# Patient Record
Sex: FEMALE | Race: WHITE | NOT HISPANIC OR LATINO | Employment: FULL TIME | URBAN - METROPOLITAN AREA
[De-identification: names, ages, dates, MRNs, and addresses within clinical notes are randomized per-mention and may not be internally consistent; named-entity substitution may affect disease eponyms.]

---

## 2018-01-12 NOTE — RESULT NOTES
Verified Results  (1) TSH WITH FT4 REFLEX [id number] 75:03CF Julienne Nguyễn    Order Number: [id number]     Test Name Result Flag Reference   TSH 2 370 uIU/mL  0 358-3 740   Patients undergoing fluorescein dye angiography may retain small amounts of fluorescein in the body for 48-72 hours post procedure  Samples containing fluorescein can produce falsely depressed TSH values  If the patient had this procedure,a specimen should be resubmitted post fluorescein clearance            The recommended reference ranges for TSH during pregnancy are as follows:  First trimester 0 1 to 2 5 uIU/mL  Second trimester  0 2 to 3 0 uIU/mL  Third trimester 0 3 to 3 0 uIU/m

## 2018-05-08 DIAGNOSIS — B00.9 HERPES: Primary | ICD-10-CM

## 2018-05-08 RX ORDER — VALACYCLOVIR HYDROCHLORIDE 500 MG/1
1 TABLET, FILM COATED ORAL DAILY
COMMUNITY
Start: 2011-05-17 | End: 2018-05-08 | Stop reason: SDUPTHER

## 2018-05-08 RX ORDER — VALACYCLOVIR HYDROCHLORIDE 1 G/1
1000 TABLET, FILM COATED ORAL DAILY
Qty: 90 TABLET | Refills: 0 | Status: SHIPPED | OUTPATIENT
Start: 2018-05-08 | End: 2018-05-15 | Stop reason: SDUPTHER

## 2018-05-08 RX ORDER — VALACYCLOVIR HYDROCHLORIDE 500 MG/1
500 TABLET, FILM COATED ORAL 2 TIMES DAILY
Qty: 30 TABLET | Refills: 0 | Status: SHIPPED | OUTPATIENT
Start: 2018-05-08 | End: 2018-05-15 | Stop reason: SDUPTHER

## 2018-05-08 RX ORDER — VALACYCLOVIR HYDROCHLORIDE 1 G/1
1 TABLET, FILM COATED ORAL DAILY
COMMUNITY
Start: 2015-08-17 | End: 2018-05-08 | Stop reason: SDUPTHER

## 2018-05-15 ENCOUNTER — ANNUAL EXAM (OUTPATIENT)
Dept: GYNECOLOGY | Facility: CLINIC | Age: 43
End: 2018-05-15
Payer: COMMERCIAL

## 2018-05-15 VITALS
HEIGHT: 63 IN | DIASTOLIC BLOOD PRESSURE: 60 MMHG | WEIGHT: 131.6 LBS | SYSTOLIC BLOOD PRESSURE: 98 MMHG | BODY MASS INDEX: 23.32 KG/M2

## 2018-05-15 DIAGNOSIS — B00.9 HERPES: ICD-10-CM

## 2018-05-15 DIAGNOSIS — Z01.419 ENCOUNTER FOR ROUTINE GYNECOLOGICAL EXAMINATION WITH PAPANICOLAOU SMEAR OF CERVIX: Primary | ICD-10-CM

## 2018-05-15 DIAGNOSIS — Z12.31 ENCOUNTER FOR SCREENING MAMMOGRAM FOR MALIGNANT NEOPLASM OF BREAST: ICD-10-CM

## 2018-05-15 PROCEDURE — 87624 HPV HI-RISK TYP POOLED RSLT: CPT | Performed by: NURSE PRACTITIONER

## 2018-05-15 PROCEDURE — G0145 SCR C/V CYTO,THINLAYER,RESCR: HCPCS | Performed by: NURSE PRACTITIONER

## 2018-05-15 PROCEDURE — 99396 PREV VISIT EST AGE 40-64: CPT | Performed by: NURSE PRACTITIONER

## 2018-05-15 RX ORDER — VALACYCLOVIR HYDROCHLORIDE 500 MG/1
500 TABLET, FILM COATED ORAL 2 TIMES DAILY
Qty: 90 TABLET | Refills: 3 | Status: SHIPPED | OUTPATIENT
Start: 2018-05-15 | End: 2018-05-20

## 2018-05-15 RX ORDER — VALACYCLOVIR HYDROCHLORIDE 1 G/1
1000 TABLET, FILM COATED ORAL DAILY
Qty: 90 TABLET | Refills: 3 | Status: SHIPPED | OUTPATIENT
Start: 2018-05-15 | End: 2019-07-22 | Stop reason: SDUPTHER

## 2018-05-15 NOTE — PROGRESS NOTES
Adrian Elias is a 40 y o  female who presents for her annual exam  Periods are regular every 28-30 days, lasting 3 - 5  days  Dysmenorrhea:moderate, occurring first 1-2 days of flow  Cyclic symptoms include none  No intermenstrual bleeding, spotting, or discharge  She continues to use Valtrex 1 gm  daily and will also take an extra 500 mg  tablet if she feels an HSV outbreak beginning  Current contraception: none  History of abnormal Pap smear: no  Family history of breast cancer: yes - Mother  Past Medical History:   Diagnosis Date    Herpes simplex      Family History   Problem Relation Age of Onset    Breast cancer Mother     Thyroid disease Mother     Cancer Father      Lung     Past Surgical History:   Procedure Laterality Date    KNEE SURGERY      MYOMECTOMY       Social History     Social History    Marital status: Single     Spouse name: N/A    Number of children: N/A    Years of education: N/A     Occupational History    Not on file       Social History Main Topics    Smoking status: Never Smoker    Smokeless tobacco: Never Used    Alcohol use Yes    Drug use: No    Sexual activity: Yes     Partners: Male     Birth control/ protection: None     Other Topics Concern    Not on file     Social History Narrative    No narrative on file       Current Outpatient Prescriptions:     valACYclovir (VALTREX) 1,000 mg tablet, Take 1 tablet (1,000 mg total) by mouth daily for 90 days, Disp: 90 tablet, Rfl: 0    valACYclovir (VALTREX) 500 mg tablet, Take 1 tablet (500 mg total) by mouth 2 (two) times a day for 5 days, Disp: 30 tablet, Rfl: 0      Review of Systems  Constitutional :no fever, feels well, no tiredness, no recent weight gain or loss  Cardiovascular: no complaints of slow or fast heart beat, no chest pain, no palpitations  Respiratory: no complaints of shortness of shortness of breath, no TORRES  Breasts:no complaints of breast pain, breast lump, or nipple discharge  Gastrointestinal: no complaints of abdominal pain, constipation,nause, vomiting, or diarrhea or bloody stools  Genitourinary : no complaints of dysuria, incontinence, pelvic pain, dysmenorrhea,vaginal discharge or abnormal vaginal bleeding  Integumentary: no complaints of skin rash or lesion,itching or dry skin  Neurological: no complaints of headache,numbness, tingling, dizziness or fainting       Objective      BP 98/60 (BP Location: Right arm, Patient Position: Sitting, Cuff Size: Standard)   Ht 5' 2 8" (1 595 m)   Wt 59 7 kg (131 lb 9 6 oz)   LMP 05/05/2018 (Approximate)   BMI 23 46 kg/m²     General:   appears stated age","cooperative","alert" normal mood and affect   Neck: Neck: normal, supple,trachea midline, no masses   Heart: regular rate and rhythm, S1, S2 normal, no murmur, click, rub or gallop   Lungs: clear to auscultation bilaterally   Breasts: Breast exam :normal, no dimpling or skin changes noted   Abdomen: soft, non-tender, without masses or organomegaly   Vulva: Normal , no lesions   Vagina: normal , no lesions or dryness   Urethra: normal   Cervix: Normal, no palpable masses A pap smear was done   Uterus: Normal , non-tender,not enlarged,no palpable masses   Adnexa: Normal, non-tender without fullness or masses                         Assessment   Normal GYN exam       Plan      All questions answered  Await pap smear results  Breast self exam technique reviewed and patient encouraged to perform self-exam monthly  Dietary diary  Follow up as needed  Mammogram   Thin prep Pap smear  We discussed her current Valtrex use  She has been advised to try taking 500 mg  daily instead of 1 gm  We also discussed her mother's history of breast cancer and the consideration of genetic testing  She will discuss genetic testing with her mother since  She is uncertain whether or not her mother was tested  The importance of beginning MMG screening was stressed

## 2018-05-17 LAB — HPV RRNA GENITAL QL NAA+PROBE: NORMAL

## 2018-05-18 LAB
LAB AP GYN PRIMARY INTERPRETATION: NORMAL
LAB AP LMP: NORMAL
Lab: NORMAL

## 2018-12-17 ENCOUNTER — DOCUMENTATION (OUTPATIENT)
Dept: GYNECOLOGY | Facility: CLINIC | Age: 43
End: 2018-12-17

## 2019-05-14 DIAGNOSIS — R92.8 ABNORMAL MAMMOGRAM: Primary | ICD-10-CM

## 2019-05-17 DIAGNOSIS — R92.8 ABNORMAL MAMMOGRAM: ICD-10-CM

## 2019-07-22 DIAGNOSIS — B00.9 HERPES: ICD-10-CM

## 2019-07-22 RX ORDER — VALACYCLOVIR HYDROCHLORIDE 1 G/1
1000 TABLET, FILM COATED ORAL DAILY
Qty: 90 TABLET | Refills: 0 | Status: SHIPPED | OUTPATIENT
Start: 2019-07-22 | End: 2019-10-21 | Stop reason: SDUPTHER

## 2019-10-21 DIAGNOSIS — B00.9 HERPES: ICD-10-CM

## 2019-10-21 RX ORDER — VALACYCLOVIR HYDROCHLORIDE 1 G/1
1000 TABLET, FILM COATED ORAL DAILY
Qty: 90 TABLET | Refills: 0 | Status: SHIPPED | OUTPATIENT
Start: 2019-10-21 | End: 2020-01-22

## 2019-11-26 NOTE — PROGRESS NOTES
Assessment/Plan:    Normal findings on routine gyn exam  Advised monthly SBE, annual CBE and continued annual mammography  Reviewed ASCCP guidelines and recommended pap with cotesting q3 yrs for this low risk patient  No pap done today  STI testing was offered and the patient declines; she reports low risk  The patient desires to continue current contraceptive method  Diet/activity recommendations - Calcium 0138-8191 mg daily and Vit D 600-100 IU daily  RTO in one year or sooner PRN  Diagnoses and all orders for this visit:    Encounter for gynecological examination (general) (routine) without abnormal findings        Subjective:      Patient ID: Radha Kilpatrick is a 38 y o  female  This patient presents for routine annual gyn exam    She denies acute gyn complaints  Menses are becoming more irregular, but not closer than 21 days  She was bleeding about every 3 days, now lasting 5 days  She has a hx of myomectomy  She denies pelvic pain, dyspareunia, breast concerns, abnormal discharge, bowel/bladder dysfunction  Pap/HPV testing up to date and normal, 5/15/18, due 2021  Last Mammography normal, 5/15/19 was a diagnostic mammo on Left after an asymmetry was noted during screening mammo  No evidence of maligancy noted with 1 year screening recommendation  She is sexually active with long term boyfriend  The following portions of the patient's history were reviewed and updated as appropriate: allergies, current medications, past family history, past medical history, past social history, past surgical history and problem list     Review of Systems   Constitutional: Negative  HENT: Negative  Respiratory: Negative  Cardiovascular: Negative  Gastrointestinal: Negative  Endocrine: Negative  Genitourinary: Negative for difficulty urinating, dyspareunia, dysuria, frequency, menstrual problem, pelvic pain, urgency, vaginal bleeding, vaginal discharge and vaginal pain     Musculoskeletal: Negative  Skin: Negative  Neurological: Negative  Psychiatric/Behavioral: Negative  Objective:      /62   Pulse 66   Ht 5' 3" (1 6 m)   Wt 61 2 kg (135 lb)   LMP 11/25/2019   BMI 23 91 kg/m²          Physical Exam   Constitutional: She is oriented to person, place, and time  She appears well-developed and well-nourished  She is cooperative  HENT:   Head: Normocephalic and atraumatic  Neck: Normal range of motion  Neck supple  No thyroid mass and no thyromegaly present  Cardiovascular: Normal rate, regular rhythm and normal heart sounds  Pulmonary/Chest: Effort normal and breath sounds normal  Right breast exhibits no inverted nipple, no mass, no nipple discharge, no skin change and no tenderness  Left breast exhibits no inverted nipple, no mass, no nipple discharge, no skin change and no tenderness  No breast tenderness or discharge  Breasts are symmetrical    Abdominal: Soft  Normal appearance and bowel sounds are normal  There is no hepatosplenomegaly  There is no tenderness  Hernia confirmed negative in the right inguinal area and confirmed negative in the left inguinal area  Genitourinary: Vagina normal and uterus normal  Rectal exam shows no external hemorrhoid  No breast tenderness or discharge  Pelvic exam was performed with patient supine  No labial fusion  There is no rash, tenderness, lesion or injury on the right labia  There is no rash, tenderness, lesion or injury on the left labia  Uterus is not deviated, not enlarged, not fixed and not tender  Cervix exhibits no motion tenderness, no discharge and no friability  Right adnexum displays no mass, no tenderness and no fullness  Left adnexum displays no mass, no tenderness and no fullness  No erythema, tenderness or bleeding in the vagina  No signs of injury around the vagina  No vaginal discharge found     Genitourinary Comments: Urethra normal without lesions  No bladder tenderness   Musculoskeletal: Normal range of motion  Lymphadenopathy:     She has no axillary adenopathy  No inguinal adenopathy noted on the right or left side  Right: No inguinal adenopathy present  Left: No inguinal adenopathy present  Neurological: She is alert and oriented to person, place, and time  Skin: Skin is warm, dry and intact  Psychiatric: She has a normal mood and affect  Her speech is normal and behavior is normal  Cognition and memory are normal    Nursing note and vitals reviewed

## 2019-11-29 ENCOUNTER — ANNUAL EXAM (OUTPATIENT)
Dept: GYNECOLOGY | Facility: CLINIC | Age: 44
End: 2019-11-29
Payer: COMMERCIAL

## 2019-11-29 VITALS
BODY MASS INDEX: 23.92 KG/M2 | SYSTOLIC BLOOD PRESSURE: 100 MMHG | HEIGHT: 63 IN | HEART RATE: 66 BPM | WEIGHT: 135 LBS | DIASTOLIC BLOOD PRESSURE: 62 MMHG

## 2019-11-29 DIAGNOSIS — Z01.419 ENCOUNTER FOR GYNECOLOGICAL EXAMINATION (GENERAL) (ROUTINE) WITHOUT ABNORMAL FINDINGS: Primary | ICD-10-CM

## 2019-11-29 PROCEDURE — 99396 PREV VISIT EST AGE 40-64: CPT | Performed by: OBSTETRICS & GYNECOLOGY

## 2019-11-29 NOTE — PATIENT INSTRUCTIONS
Normal findings on routine gyn exam  Advised monthly SBE, annual CBE and continued annual mammography  Reviewed ASCCP guidelines and recommended pap with cotesting q3 yrs for this low risk patient  No pap done today  STI testing was offered and the patient declines; she reports low risk  The patient desires to continue current contraceptive method  Diet/activity recommendations - Calcium 2881-8503 mg daily and Vit D 600-100 IU daily  RTO in one year or sooner PRN

## 2020-01-22 DIAGNOSIS — B00.9 HERPES: ICD-10-CM

## 2020-01-22 RX ORDER — VALACYCLOVIR HYDROCHLORIDE 1 G/1
TABLET, FILM COATED ORAL
Qty: 90 TABLET | Refills: 0 | Status: SHIPPED | OUTPATIENT
Start: 2020-01-22 | End: 2020-04-16 | Stop reason: SDUPTHER

## 2020-04-16 DIAGNOSIS — B00.9 HERPES: ICD-10-CM

## 2020-04-17 RX ORDER — VALACYCLOVIR HYDROCHLORIDE 1 G/1
1000 TABLET, FILM COATED ORAL DAILY
Qty: 90 TABLET | Refills: 3 | Status: SHIPPED | OUTPATIENT
Start: 2020-04-17 | End: 2021-03-29 | Stop reason: SDUPTHER

## 2020-07-27 ENCOUNTER — TELEPHONE (OUTPATIENT)
Dept: GYNECOLOGY | Facility: CLINIC | Age: 45
End: 2020-07-27

## 2020-07-27 DIAGNOSIS — N64.4 BREAST PAIN, RIGHT: Primary | ICD-10-CM

## 2020-07-27 DIAGNOSIS — N64.4 PAIN OF RIGHT BREAST: Primary | ICD-10-CM

## 2020-07-27 DIAGNOSIS — Z12.31 ENCOUNTER FOR SCREENING MAMMOGRAM FOR MALIGNANT NEOPLASM OF BREAST: ICD-10-CM

## 2020-07-27 NOTE — TELEPHONE ENCOUNTER
Pt called and states She having right sided breast pain  ok Per Lisa JAMESON   To send for a diagnostic

## 2021-03-28 DIAGNOSIS — B00.9 HERPES: ICD-10-CM

## 2021-03-29 DIAGNOSIS — B00.9 HERPES: ICD-10-CM

## 2021-03-29 RX ORDER — VALACYCLOVIR HYDROCHLORIDE 1 G/1
1000 TABLET, FILM COATED ORAL DAILY
Qty: 90 TABLET | Refills: 3 | Status: SHIPPED | OUTPATIENT
Start: 2021-03-29 | End: 2021-04-13 | Stop reason: SDUPTHER

## 2021-03-29 RX ORDER — VALACYCLOVIR HYDROCHLORIDE 1 G/1
TABLET, FILM COATED ORAL
Qty: 90 TABLET | Refills: 3 | OUTPATIENT
Start: 2021-03-29

## 2021-04-13 ENCOUNTER — ANNUAL EXAM (OUTPATIENT)
Dept: GYNECOLOGY | Facility: CLINIC | Age: 46
End: 2021-04-13
Payer: COMMERCIAL

## 2021-04-13 VITALS
SYSTOLIC BLOOD PRESSURE: 98 MMHG | HEART RATE: 62 BPM | HEIGHT: 63 IN | BODY MASS INDEX: 24.27 KG/M2 | DIASTOLIC BLOOD PRESSURE: 60 MMHG | WEIGHT: 137 LBS

## 2021-04-13 DIAGNOSIS — Z12.4 PAPANICOLAOU SMEAR FOR CERVICAL CANCER SCREENING: ICD-10-CM

## 2021-04-13 DIAGNOSIS — B00.9 HERPES: ICD-10-CM

## 2021-04-13 DIAGNOSIS — N85.2 ENLARGED UTERUS: ICD-10-CM

## 2021-04-13 DIAGNOSIS — Z01.411 ENCOUNTER FOR GYNECOLOGICAL EXAMINATION (GENERAL) (ROUTINE) WITH ABNORMAL FINDINGS: Primary | ICD-10-CM

## 2021-04-13 PROCEDURE — 99396 PREV VISIT EST AGE 40-64: CPT | Performed by: OBSTETRICS & GYNECOLOGY

## 2021-04-13 PROCEDURE — G0145 SCR C/V CYTO,THINLAYER,RESCR: HCPCS | Performed by: OBSTETRICS & GYNECOLOGY

## 2021-04-13 PROCEDURE — 87624 HPV HI-RISK TYP POOLED RSLT: CPT | Performed by: OBSTETRICS & GYNECOLOGY

## 2021-04-13 RX ORDER — VALACYCLOVIR HYDROCHLORIDE 1 G/1
1000 TABLET, FILM COATED ORAL DAILY
Qty: 90 TABLET | Refills: 3 | Status: SHIPPED | OUTPATIENT
Start: 2021-04-13 | End: 2022-06-03

## 2021-04-13 NOTE — PROGRESS NOTES
Assessment/Plan:    Patient given the option of have TVU at a local facility given that she travels from [place] to our office, she opted to have TVU/SIS in our office  Patient aware to go to ED with severe pain  She has been asymptomatic up until this time  Will continue daily Valtrex suppression  Advised monthly SBE, annual CBE and  continued annual mammography  Reviewed ASCCP guidelines and pap with cotesting done today  STI testing was offered and the patient declines; she reports low risk  The patient declines contraception  She is aware that pregnancy remains possible  Diet/activity recommendations - Calcium 0671-9325 mg daily and Vit D 600-100 IU daily  RTO in one year or sooner PRN  Diagnoses and all orders for this visit:    Encounter for gynecological examination (general) (routine) with abnormal findings    Papanicolaou smear for cervical cancer screening  -     Liquid-based pap, screening    Herpes  -     valACYclovir (VALTREX) 1,000 mg tablet; Take 1 tablet (1,000 mg total) by mouth daily    Enlarged uterus        Subjective:      Patient ID: Yudith Agudelo is a 41 y o  female  This patient presents for routine annual gyn exam    She denies gyn concerns  Menses are becoming more irregular and light to mod  She has a hx of myomectomy 22 years ago  Hx of genital herpes, on daily valtrex without outbreaks for many years  She denies pelvic pain, dyspareunia, breast concerns, abnormal discharge, bowel/bladder dysfunction, depression/anxiety  Pap/HPV testing up to date and normal, 5/15/18  Last Mammography normal, 8/4/20 and was diagnostic due to right breast pain which has resolved  Monogamous for 8 years  She denies STI concerns  She has not conceived and has not used BC             The following portions of the patient's history were reviewed and updated as appropriate: allergies, current medications, past family history, past medical history, past social history, past surgical history and problem list     Review of Systems   Constitutional: Negative  HENT: Negative  Respiratory: Negative  Cardiovascular: Negative  Gastrointestinal: Negative  Endocrine: Negative  Genitourinary: Negative for difficulty urinating, dyspareunia, dysuria, frequency, menstrual problem, pelvic pain, urgency, vaginal bleeding, vaginal discharge and vaginal pain  Musculoskeletal: Negative  Skin: Negative  Neurological: Negative  Psychiatric/Behavioral: Negative  Objective:      BP 98/60 (BP Location: Left arm, Patient Position: Sitting)   Pulse 62   Ht 5' 3 25" (1 607 m)   Wt 62 1 kg (137 lb)   LMP 03/18/2021   BMI 24 08 kg/m²          Physical Exam  Vitals signs and nursing note reviewed  Exam conducted with a chaperone present  Constitutional:       Appearance: Normal appearance  She is well-developed  HENT:      Head: Normocephalic and atraumatic  Neck:      Musculoskeletal: Normal range of motion and neck supple  Thyroid: No thyroid mass or thyromegaly  Cardiovascular:      Rate and Rhythm: Normal rate and regular rhythm  Heart sounds: Normal heart sounds  Pulmonary:      Effort: Pulmonary effort is normal       Breath sounds: Normal breath sounds  Chest:      Breasts: Breasts are symmetrical          Right: No inverted nipple, mass, nipple discharge, skin change or tenderness  Left: No inverted nipple, mass, nipple discharge, skin change or tenderness  Abdominal:      General: Bowel sounds are normal       Palpations: Abdomen is soft  Tenderness: There is no abdominal tenderness  Hernia: There is no hernia in the left inguinal area or right inguinal area  Genitourinary:     General: Normal vulva  Exam position: Supine  Pubic Area: No rash  Labia:         Right: No rash, tenderness, lesion or injury  Left: No rash, tenderness, lesion or injury         Urethra: No prolapse, urethral pain, urethral swelling or urethral lesion  Vagina: Normal  No signs of injury and foreign body  No vaginal discharge, erythema, tenderness, bleeding, lesions or prolapsed vaginal walls  Cervix: No cervical motion tenderness, discharge, friability, lesion, erythema, cervical bleeding or eversion  Uterus: Deviated (to the right) and enlarged  Not fixed, not tender and no uterine prolapse  Adnexa:         Right: No mass, tenderness or fullness  Left: No mass, tenderness or fullness  Rectum: No external hemorrhoid  Comments: Urethra normal without lesions  No bladder tenderness  Musculoskeletal: Normal range of motion  Lymphadenopathy:      Lower Body: No right inguinal adenopathy  No left inguinal adenopathy  Skin:     General: Skin is warm and dry  Neurological:      Mental Status: She is alert and oriented to person, place, and time  Psychiatric:         Speech: Speech normal          Behavior: Behavior normal  Behavior is cooperative

## 2021-04-16 LAB
HPV HR 12 DNA CVX QL NAA+PROBE: NEGATIVE
HPV16 DNA CVX QL NAA+PROBE: NEGATIVE
HPV18 DNA CVX QL NAA+PROBE: NEGATIVE

## 2021-04-21 LAB
LAB AP GYN PRIMARY INTERPRETATION: NORMAL
Lab: NORMAL

## 2021-05-05 ENCOUNTER — OFFICE VISIT (OUTPATIENT)
Dept: GYNECOLOGY | Facility: CLINIC | Age: 46
End: 2021-05-05
Payer: COMMERCIAL

## 2021-05-05 ENCOUNTER — ULTRASOUND (OUTPATIENT)
Dept: GYNECOLOGY | Facility: CLINIC | Age: 46
End: 2021-05-05
Payer: COMMERCIAL

## 2021-05-05 DIAGNOSIS — N92.6 IRREGULAR MENSES: Primary | ICD-10-CM

## 2021-05-05 DIAGNOSIS — D21.9 FIBROIDS: Primary | ICD-10-CM

## 2021-05-05 DIAGNOSIS — N92.0 MENORRHAGIA WITH REGULAR CYCLE: ICD-10-CM

## 2021-05-05 LAB — SL AMB POCT URINE HCG: NORMAL

## 2021-05-05 PROCEDURE — 88305 TISSUE EXAM BY PATHOLOGIST: CPT | Performed by: PATHOLOGY

## 2021-05-05 PROCEDURE — 58100 BIOPSY OF UTERUS LINING: CPT | Performed by: OBSTETRICS & GYNECOLOGY

## 2021-05-05 PROCEDURE — 76830 TRANSVAGINAL US NON-OB: CPT | Performed by: OBSTETRICS & GYNECOLOGY

## 2021-05-05 PROCEDURE — 58340 CATHETER FOR HYSTEROGRAPHY: CPT | Performed by: OBSTETRICS & GYNECOLOGY

## 2021-05-05 PROCEDURE — 99214 OFFICE O/P EST MOD 30 MIN: CPT | Performed by: OBSTETRICS & GYNECOLOGY

## 2021-05-05 PROCEDURE — 81025 URINE PREGNANCY TEST: CPT | Performed by: OBSTETRICS & GYNECOLOGY

## 2021-05-05 PROCEDURE — 76831 ECHO EXAM UTERUS: CPT | Performed by: OBSTETRICS & GYNECOLOGY

## 2021-05-05 NOTE — PROGRESS NOTES
AMB US Pelvic Non OB    Date/Time: 5/5/2021 7:31 AM  Performed by: Scot Moran  Authorized by: Gregorio Chauhan DO   Universal Protocol:  Patient identity confirmed: verbally with patient      Procedure details:     Technique:  Transvaginal US, Non-OB    Position: lithotomy exam    Uterine findings:     Length (cm): 7 96    Height (cm):  5 78    Width (cm):  6 87    Endometrial stripe: identified      Endometrium thickness (mm):  20 9  Left ovary findings:     Left ovary:  Visualized    Length (cm): 4 12    Height (cm): 2 22    Width (cm): 2 89  Right ovary findings:     Right ovary:  Visualized    Length (cm): 4 17    Height (cm): 3 48    Width (cm): 3 09  Other findings:     Free pelvic fluid: not identified      Free peritoneal fluid: not identified    Post-Procedure Details:     Impression:  Anteverted uterus demonstrates multiple fibroids  Largest are posterior subserosal mid uterus 2 4cm, anterior right fundal subserosal 4 1cm, anterior subserosal 1 9, and left posterior lower uterine segment 3 2cm  The bilateral ovaries appear within normal limits  The right ovary contains two follicles, 7 1VA and 6 7KJ  No free fluid  Tolerance: Tolerated well, no immediate complications    Complications: no complications    Additional Procedure Comments:      [employer] P5 transvaginal transducer E8C with Serial Number [id number] was used during procedure and subsequently cleaned with high level disinfection utilizing the [facility]       Ultrasound performed at:     [facility] for [facility] [address]  [address]  [address] [place] [address] Rd, [address]   Phone: [phone]  Fax:  [phone]    Date/Time: 5/5/2021 7:32 AM  Performed by: Gregorio Chauhan DO  Authorized by: Gregorio Chauhan DO   Universal Protocol:  Patient identity confirmed: verbally with patient      Pre-procedure:     Prepped with: Betadine    Procedure:     Catheter inserted: yes Uterine cavity distended with saline: yes    Post-procedure:     Patient observed: yes      Post procedure instructions given to patient: yes      Patient tolerated procedure well with no complications: yes    Comments:      Sonohysterogram demonstrates a thickened endometrium with irregular walls     Endometrial biopsy    Date/Time: 5/5/2021 7:32 AM  Performed by: Maren Cyr DO  Authorized by: Maren Cyr DO     Indication:     Indications: Post-menopausal bleeding    Procedure:     Procedure: endometrial biopsy with Pipelle      A bivalve speculum was placed in the vagina: yes      Cervix cleaned and prepped: yes      Specimen collected: specimen collected and sent to pathology      Patient tolerated procedure well with no complications: yes

## 2021-05-05 NOTE — PROGRESS NOTES
Assessment/Plan:    Discussed fibroid uterus/menorrhagia and options including following versus medical management with either Depo-Provera or Al Hussain or Orlissa  Also discussed surgical options  At this point since she is only mildly symptomatic she opts to follow  Diagnoses and all orders for this visit:    Fibroids    Menorrhagia with regular cycle        Subjective:      Patient ID: Lorna Mendoza is a 41 y o  female  HPI G2 P 0020 presented to the office for transvaginal scan saline infusion hysterosonography possible biopsy  Patient has a known fibroid uterus  She is status post laparoscopic myomectomy 22 years ago  She states her menses are regular albeit heavy  Been like this for several years  The following portions of the patient's history were reviewed and updated as appropriate:   She  has a past medical history of Herpes simplex  She There are no active problems to display for this patient  She  has a past surgical history that includes Knee surgery and Myomectomy  Her family history includes Breast cancer in her mother; Cancer in her father; Thyroid disease in her mother  She  reports that she has quit smoking  Her smoking use included cigarettes  She started smoking about 19 years ago  She has never used smokeless tobacco  She reports current alcohol use  She reports that she does not use drugs  Current Outpatient Medications   Medication Sig Dispense Refill    valACYclovir (VALTREX) 1,000 mg tablet Take 1 tablet (1,000 mg total) by mouth daily 90 tablet 3     No current facility-administered medications for this visit  Current Outpatient Medications on File Prior to Visit   Medication Sig    valACYclovir (VALTREX) 1,000 mg tablet Take 1 tablet (1,000 mg total) by mouth daily     No current facility-administered medications on file prior to visit  She has No Known Allergies       Review of Systems      Objective: There were no vitals taken for this visit  Physical Exam  Vitals signs reviewed  Constitutional:       Appearance: Normal appearance  Cardiovascular:      Rate and Rhythm: Normal rate and regular rhythm  Pulses: Normal pulses  Heart sounds: Normal heart sounds  Pulmonary:      Effort: Pulmonary effort is normal  No respiratory distress  Breath sounds: Normal breath sounds  Abdominal:      General: Abdomen is flat  There is no distension  Palpations: Abdomen is soft  There is no mass  Tenderness: There is no abdominal tenderness  There is no guarding or rebound  Hernia: No hernia is present  There is no hernia in the left inguinal area or right inguinal area  Genitourinary:     General: Normal vulva  Labia:         Right: No rash, tenderness or lesion  Left: No rash, tenderness or lesion  Vagina: Normal       Cervix: Normal       Uterus: Enlarged (10-12 wk size)  Not deviated, not fixed and not tender  Adnexa:         Right: No mass, tenderness or fullness  Left: No mass, tenderness or fullness  Lymphadenopathy:      Lower Body: No right inguinal adenopathy  No left inguinal adenopathy  Neurological:      Mental Status: She is alert         AMB US Pelvic Non OB   Date/Time: 5/5/2021 7:31 AM  Performed by: Marianna Moreno  Authorized by: Jae Huff DO   Universal Protocol:  Patient identity confirmed: verbally with patient        Procedure details:     Technique:  Transvaginal US, Non-OB    Position: lithotomy exam    Uterine findings:     Length (cm): 7 96    Height (cm):  5 78    Width (cm):  6 87    Endometrial stripe: identified      Endometrium thickness (mm):  20 9  Left ovary findings:     Left ovary:  Visualized    Length (cm): 4 12    Height (cm): 2 22    Width (cm): 2 89  Right ovary findings:     Right ovary:  Visualized    Length (cm): 4 17    Height (cm): 3 48    Width (cm): 3 09  Other findings:     Free pelvic fluid: not identified      Free peritoneal fluid: not identified    Post-Procedure Details:     Impression:  Anteverted uterus demonstrates multiple fibroids  Largest are posterior subserosal mid uterus 2 4cm, anterior right fundal subserosal 4 1cm, anterior subserosal 1 9, and left posterior lower uterine segment 3 2cm  The bilateral ovaries appear within normal limits  The right ovary contains two follicles, 0 2DO and 4 1OP  No free fluid  Tolerance: Tolerated well, no immediate complications    Complications: no complications    Additional Procedure Comments:      [employer] P5 transvaginal transducer E8C with Serial Number [id number] was used during procedure and subsequently cleaned with high level disinfection utilizing the [employer]       Ultrasound performed at:   [place] [id number] for [facility] 126  [address]  [address], [address]   Phone: [phone]  Fax:  [phone]     Sonohysterogram   Date/Time: 5/5/2021 7:32 AM  Performed by: Markus Levi DO  Authorized by: Markus Levi DO   Universal Protocol:  Patient identity confirmed: verbally with patient        Pre-procedure:     Prepped with: Betadine    Procedure:     Catheter inserted: yes      Uterine cavity distended with saline: yes    Post-procedure:     Patient observed: yes      Post procedure instructions given to patient: yes      Patient tolerated procedure well with no complications: yes    Comments:      Sonohysterogram demonstrates a thickened endometrium with irregular walls     Endometrial biopsy   Date/Time: 5/5/2021 7:32 AM  Performed by: Markus Levi DO  Authorized by: Markus Levi DO      Indication:     Indications: Post-menopausal bleeding    Procedure:     Procedure: endometrial biopsy with Pipelle      A bivalve speculum was placed in the vagina: yes      Cervix cleaned and prepped: yes      Specimen collected: specimen collected and sent to pathology      Patient tolerated procedure well with no complications: yes

## 2022-06-07 ENCOUNTER — TELEPHONE (OUTPATIENT)
Dept: GYNECOLOGY | Facility: CLINIC | Age: 47
End: 2022-06-07

## 2022-06-07 NOTE — TELEPHONE ENCOUNTER
----- Message from [id number]  [facility] sent at 6/3/2022  8:02 AM EDT -----  Regarding: needs annual  Please call to schedule annual

## 2022-08-18 DIAGNOSIS — B00.9 HERPES: ICD-10-CM

## 2022-08-19 RX ORDER — VALACYCLOVIR HYDROCHLORIDE 1 G/1
TABLET, FILM COATED ORAL
Qty: 90 TABLET | Refills: 0 | Status: SHIPPED | OUTPATIENT
Start: 2022-08-19 | End: 2022-11-17

## 2022-12-01 DIAGNOSIS — B00.9 HERPES: ICD-10-CM

## 2022-12-01 RX ORDER — VALACYCLOVIR HYDROCHLORIDE 1 G/1
1000 TABLET, FILM COATED ORAL DAILY
Qty: 90 TABLET | Refills: 0 | Status: SHIPPED | OUTPATIENT
Start: 2022-12-01 | End: 2023-03-01

## 2023-01-06 ENCOUNTER — ANNUAL EXAM (OUTPATIENT)
Dept: GYNECOLOGY | Facility: CLINIC | Age: 48
End: 2023-01-06

## 2023-01-06 VITALS
DIASTOLIC BLOOD PRESSURE: 78 MMHG | WEIGHT: 134 LBS | HEIGHT: 63 IN | BODY MASS INDEX: 23.74 KG/M2 | SYSTOLIC BLOOD PRESSURE: 100 MMHG

## 2023-01-06 DIAGNOSIS — Z01.411 ENCOUNTER FOR GYNECOLOGICAL EXAMINATION (GENERAL) (ROUTINE) WITH ABNORMAL FINDINGS: Primary | ICD-10-CM

## 2023-01-06 DIAGNOSIS — N92.0 MENORRHAGIA WITH REGULAR CYCLE: ICD-10-CM

## 2023-01-06 DIAGNOSIS — Z12.4 PAPANICOLAOU SMEAR FOR CERVICAL CANCER SCREENING: ICD-10-CM

## 2023-01-06 DIAGNOSIS — D21.9 FIBROIDS: ICD-10-CM

## 2023-01-06 RX ORDER — MELATONIN
1000 DAILY
COMMUNITY

## 2023-01-06 RX ORDER — MAGNESIUM L-LACTATE 84 MG
84 TABLET, EXTENDED RELEASE ORAL DAILY
COMMUNITY

## 2023-01-06 NOTE — PROGRESS NOTES
Assessment/Plan:       Diagnoses and all orders for this visit:    Encounter for gynecological examination (general) (routine) with abnormal findings    Papanicolaou smear for cervical cancer screening  -     Liquid-based pap, screening    Fibroids; discussed fibroid uterus and options including following versus medical management versus uterine artery embolization versus surgical management with either myomectomy or hysterectomy  The patient has completed childbearing  She has opted to proceed with a hysterectomy  Discussed LS BS versus TLH BS  She has opted to proceed with an [facility] BS  The risks of the surgery were discussed including, but not limited to, infection, hemorrhage, bowel bladder or vascular injury, possible necessity for laparotomy  Menorrhagia with regular cycle      20 additional minutes discussing fibroids and coordinating care        Subjective:      Patient ID: Mario Alberto Arzate is a 56 y o  female  HPI G 2 P 0020, presents for annual examination  She also presents to discuss her fibroids and options  Patient is status post laparoscopic myomectomy at age 30  Over the past several years she has been experiencing heavy flows for least 2 days of her cycle  This is associated with increasing cramps lower back pain urinary frequency and pelvic pressure  She had an ultrasound done May 2021 which revealed at least 4 dominant fibroids measuring 2 4 cm, 4 1 cm, 1 9 cm, 3 2 cm  Endometrial biopsy at that time was benign  No urinary complaints other than urinary frequency  The following portions of the patient's history were reviewed and updated as appropriate:   She  has a past medical history of Herpes simplex  She There are no problems to display for this patient  She  has a past surgical history that includes Knee surgery and Myomectomy  Her family history includes Breast cancer in her mother; Cancer in her father; Thyroid disease in her mother    She  reports that she has quit smoking  Her smoking use included cigarettes  She started smoking about 21 years ago  She has a 2 50 pack-year smoking history  She has never used smokeless tobacco  She reports current alcohol use of about 2 0 standard drinks per week  She reports that she does not use drugs  Current Outpatient Medications   Medication Sig Dispense Refill   • cholecalciferol (VITAMIN D3) 1,000 units tablet Take 1,000 Units by mouth daily     • magnesium (MAGTAB) 84 MG (7MEQ) TBCR Take 84 mg by mouth daily     • Probiotic Product (PROBIOTIC-10 PO) Take by mouth     • valACYclovir (VALTREX) 1,000 mg tablet Take 1 tablet (1,000 mg total) by mouth daily 90 tablet 0     No current facility-administered medications for this visit  Current Outpatient Medications on File Prior to Visit   Medication Sig   • cholecalciferol (VITAMIN D3) 1,000 units tablet Take 1,000 Units by mouth daily   • magnesium (MAGTAB) 84 MG (7MEQ) TBCR Take 84 mg by mouth daily   • Probiotic Product (PROBIOTIC-10 PO) Take by mouth   • valACYclovir (VALTREX) 1,000 mg tablet Take 1 tablet (1,000 mg total) by mouth daily     No current facility-administered medications on file prior to visit  She has No Known Allergies       Review of Systems   Constitutional: Negative  Gastrointestinal: Negative  Genitourinary: Positive for frequency, menstrual problem and pelvic pain  Objective:      /78 (BP Location: Left arm)   Ht 5' 3 25" (1 607 m)   Wt 60 8 kg (134 lb)   LMP 12/25/2022   BMI 23 55 kg/m²          Physical Exam  Vitals reviewed  Constitutional:       Appearance: Normal appearance  She is normal weight  Cardiovascular:      Rate and Rhythm: Normal rate and regular rhythm  Pulses: Normal pulses  Heart sounds: Normal heart sounds  No murmur heard  Pulmonary:      Effort: Pulmonary effort is normal  No respiratory distress  Breath sounds: Normal breath sounds     Chest:   Breasts:     Right: No swelling, bleeding, inverted nipple, mass, nipple discharge, skin change or tenderness  Left: No swelling, bleeding, inverted nipple, mass, nipple discharge, skin change or tenderness  Abdominal:      General: There is no distension  Palpations: Abdomen is soft  There is no mass  Tenderness: There is no abdominal tenderness  There is no guarding or rebound  Hernia: No hernia is present  There is no hernia in the left inguinal area or right inguinal area  Genitourinary:     General: Normal vulva  Labia:         Right: No rash, tenderness or lesion  Left: No rash, tenderness or lesion  Vagina: Normal       Cervix: Normal       Uterus: Enlarged (12 wk size; irregular contour)  Adnexa:         Right: No mass, tenderness or fullness  Left: No mass, tenderness or fullness  Musculoskeletal:      Cervical back: Normal range of motion and neck supple  No tenderness  Lymphadenopathy:      Cervical: No cervical adenopathy  Upper Body:      Right upper body: No supraclavicular, axillary or pectoral adenopathy  Left upper body: No supraclavicular, axillary or pectoral adenopathy  Lower Body: No right inguinal adenopathy  No left inguinal adenopathy  Neurological:      Mental Status: She is alert

## 2023-01-16 ENCOUNTER — DOCUMENTATION (OUTPATIENT)
Dept: GYNECOLOGY | Facility: CLINIC | Age: 48
End: 2023-01-16

## 2023-01-16 LAB
LAB AP GYN PRIMARY INTERPRETATION: NORMAL
Lab: NORMAL

## 2023-02-24 ENCOUNTER — TELEPHONE (OUTPATIENT)
Dept: GYNECOLOGY | Facility: CLINIC | Age: 48
End: 2023-02-24

## 2023-03-01 DIAGNOSIS — B00.9 HERPES: ICD-10-CM

## 2023-03-01 RX ORDER — VALACYCLOVIR HYDROCHLORIDE 1 G/1
1000 TABLET, FILM COATED ORAL DAILY
Qty: 90 TABLET | Refills: 3 | Status: SHIPPED | OUTPATIENT
Start: 2023-03-01 | End: 2023-05-30

## 2023-04-19 PROCEDURE — NC001 PR NO CHARGE: Performed by: OBSTETRICS & GYNECOLOGY

## 2023-04-19 NOTE — H&P
Fibroids; discussed fibroid uterus and options including following versus medical management versus uterine artery embolization versus surgical management with either myomectomy or hysterectomy  The patient has completed childbearing  She has opted to proceed with a hysterectomy  Discussed LS BS versus TLH BS  She has opted to proceed with an [facility] BS  The risks of the surgery were discussed including, but not limited to, infection, hemorrhage, bowel, bladder, or vascular injury, possible necessity for laparotomy      Menorrhagia with regular cycle        20 additional minutes discussing fibroids and coordinating care         Subjective:       Patient ID: Liane Del Rio is a 56 y o  female      HPI G 2 P 0020, presents for annual examination  She also presents to discuss her fibroids and options  Patient is status post laparoscopic myomectomy at age 30  Over the past several years she has been experiencing heavy flows for at  least 2 days of her cycle  This is associated with increasing cramps, lower back pain, urinary frequency, and pelvic pressure  She had an ultrasound done May 2021 which revealed at least 4 dominant fibroids measuring 2 4 cm, 4 1 cm, 1 9 cm, 3 2 cm  Endometrial biopsy at that time was benign  No urinary complaints other than urinary frequency      The following portions of the patient's history were reviewed and updated as appropriate:   She  has a past medical history of Herpes simplex  She There are no problems to display for this patient      She  has a past surgical history that includes Knee surgery and Myomectomy  Her family history includes Breast cancer in her mother; Cancer in her father; Thyroid disease in her mother  She  reports that she has quit smoking  Her smoking use included cigarettes  She started smoking about 21 years ago  She has a 2 50 pack-year smoking history   She has never used smokeless tobacco  She reports current alcohol use of about 2 0 standard drinks "per week  She reports that she does not use drugs  Current Outpatient Medications   Medication Sig Dispense Refill   • cholecalciferol (VITAMIN D3) 1,000 units tablet Take 1,000 Units by mouth daily       • magnesium (MAGTAB) 84 MG (7MEQ) TBCR Take 84 mg by mouth daily       • Probiotic Product (PROBIOTIC-10 PO) Take by mouth       • valACYclovir (VALTREX) 1,000 mg tablet Take 1 tablet (1,000 mg total) by mouth daily 90 tablet 0      No current facility-administered medications for this visit            Current Outpatient Medications on File Prior to Visit   Medication Sig   • cholecalciferol (VITAMIN D3) 1,000 units tablet Take 1,000 Units by mouth daily   • magnesium (MAGTAB) 84 MG (7MEQ) TBCR Take 84 mg by mouth daily   • Probiotic Product (PROBIOTIC-10 PO) Take by mouth   • valACYclovir (VALTREX) 1,000 mg tablet Take 1 tablet (1,000 mg total) by mouth daily      No current facility-administered medications on file prior to visit       She has No Known Allergies        Review of Systems   Constitutional: Negative  Gastrointestinal: Negative  Genitourinary: Positive for frequency, menstrual problem and pelvic pain           Objective:        /78 (BP Location: Left arm)   Ht 5' 3 25\" (1 607 m)   Wt 60 8 kg (134 lb)   LMP 12/25/2022   BMI 23 55 kg/m²             Physical Exam  Vitals reviewed  Constitutional:       Appearance: Normal appearance  She is normal weight  Cardiovascular:      Rate and Rhythm: Normal rate and regular rhythm  Pulses: Normal pulses  Heart sounds: Normal heart sounds  No murmur heard  Pulmonary:      Effort: Pulmonary effort is normal  No respiratory distress  Breath sounds: Normal breath sounds  Chest:   Breasts:     Right: No swelling, bleeding, inverted nipple, mass, nipple discharge, skin change or tenderness  Left: No swelling, bleeding, inverted nipple, mass, nipple discharge, skin change or tenderness     Abdominal:      General: There " is no distension  Palpations: Abdomen is soft  There is no mass  Tenderness: There is no abdominal tenderness  There is no guarding or rebound  Hernia: No hernia is present  There is no hernia in the left inguinal area or right inguinal area  Genitourinary:     General: Normal vulva  Labia:         Right: No rash, tenderness or lesion  Left: No rash, tenderness or lesion  Vagina: Normal       Cervix: Normal       Uterus: Enlarged (12 wk size; irregular contour)  Adnexa:         Right: No mass, tenderness or fullness  Left: No mass, tenderness or fullness  Musculoskeletal:      Cervical back: Normal range of motion and neck supple  No tenderness  Lymphadenopathy:      Cervical: No cervical adenopathy  Upper Body:      Right upper body: No supraclavicular, axillary or pectoral adenopathy  Left upper body: No supraclavicular, axillary or pectoral adenopathy  Lower Body: No right inguinal adenopathy  No left inguinal adenopathy  Neurological:      Mental Status: She is alert

## 2023-04-21 ENCOUNTER — TELEPHONE (OUTPATIENT)
Dept: GYNECOLOGY | Facility: CLINIC | Age: 48
End: 2023-04-21

## 2023-04-21 NOTE — TELEPHONE ENCOUNTER
Pt feels like she is going crazy  Her hormones are all out of whack and her  Periods are all of a sudden coming every two weeks  Is there something she can do or take until surgery? Please advise

## 2023-04-24 ENCOUNTER — TELEPHONE (OUTPATIENT)
Dept: GYNECOLOGY | Facility: CLINIC | Age: 48
End: 2023-04-24

## 2023-04-24 DIAGNOSIS — N92.6 IRREGULAR MENSES: Primary | ICD-10-CM

## 2023-04-24 RX ORDER — NORETHINDRONE ACETATE AND ETHINYL ESTRADIOL, ETHINYL ESTRADIOL AND FERROUS FUMARATE 1MG-10(24)
1 KIT ORAL DAILY
Qty: 84 TABLET | Refills: 3 | Status: SHIPPED | OUTPATIENT
Start: 2023-04-24 | End: 2023-05-16

## 2023-04-24 NOTE — TELEPHONE ENCOUNTER
Pt  Would like to try Loloestrin  [facility]

## 2023-04-25 ENCOUNTER — TELEPHONE (OUTPATIENT)
Dept: GYNECOLOGY | Facility: CLINIC | Age: 48
End: 2023-04-25

## 2023-05-09 ENCOUNTER — TELEPHONE (OUTPATIENT)
Dept: GYNECOLOGY | Facility: CLINIC | Age: 48
End: 2023-05-09

## 2023-05-09 LAB
ABO GROUP BLD: NORMAL
EST. AVERAGE GLUCOSE BLD GHB EST-MCNC: 105 MG/DL
HBA1C MFR BLD: 5.3 % (ref 4.8–5.6)
HCT VFR BLD AUTO: 39.1 % (ref 34–46.6)
HGB BLD-MCNC: 13.1 G/DL (ref 11.1–15.9)
RH BLD: POSITIVE

## 2023-05-09 NOTE — TELEPHONE ENCOUNTER
Dr Gilbert Mention -  Can you see any reason patient would need a cardiologist clearance? She is under 50yo and states she does not have any cardiac hx  Please advise  Pt received a phone call yesterday from [facility] stating she needed to see her cardiologist or family dr prior to surgery  Pt is under 55yo and does not have caridac issues  So she was concerned why they would tell her this  And also she lives in [place] and had her blood drawn at [facility] and was also told she can't do that, that it had to be done at [facility]  Pt does not have a [facility] facility even remotely close to her and was under the impression as long as [facility] faxed us the results she was fine  She went yesterday for all the labs and they even told her they can draw all of them and without any issues  Please advise if there is a issue with what she did and was told  Education provided about continuation of care, follow up care and medication administration. Parent/guardian able to provided teach back about discharge instructions. Pt discharged home with parent. Pt acting age appropriately, respirations regular and unlabored, cap refill less than two seconds. Skin pink, dry and warm. Lungs clear bilaterally. No further complaints at this time. Parent verbalized understanding of discharge paperwork and has no further questions at this time.

## 2023-05-16 NOTE — PRE-PROCEDURE INSTRUCTIONS
Pre-Surgery Instructions:   Medication Instructions   • B Complex Vitamins (B COMPLEX PO) Stop taking 7 days prior to surgery  • cholecalciferol (VITAMIN D3) 1,000 units tablet Stop taking 7 days prior to surgery  • DANDELION PO Stop taking 7 days prior to surgery  • FERROUS SULFATE PO Stop taking 7 days prior to surgery  • levOCARNitine (CARNITINE PO) Stop taking 7 days prior to surgery  • magnesium (MAGTAB) 84 MG (7MEQ) TBCR Stop taking 7 days prior to surgery  • Probiotic Product (PROBIOTIC-10 PO) Stop taking 7 days prior to surgery  • Sodium Hyaluronate, oral, (HYALURONIC ACID PO) Stop taking 7 days prior to surgery  • valACYclovir (VALTREX) 1,000 mg tablet Take day of surgery  Medication instructions for day surgery reviewed  Please use only a sip of water to take your instructed medications  Avoid all over the counter vitamins, supplements and NSAIDS for one week prior to surgery per anesthesia guidelines  Tylenol is ok to take as needed  You will receive a call one business day prior to surgery with an arrival time and hospital directions  If your surgery is scheduled on a Monday, the hospital will be calling you on the Friday prior to your surgery  If you have not heard from anyone by 8pm, please call the hospital supervisor through the hospital  at [phone]  [facility] [phone])  Do not eat or drink anything after midnight the night before your surgery, including candy, mints, lifesavers, or chewing gum  Do not drink alcohol 24hrs before your surgery  Pt received carb drinks x 3 and was instructed on timing of consumption  Try not to smoke at least 24hrs before your surgery  Follow the pre surgery showering instructions as listed in the [facility] Surgical Experience Booklet” or otherwise provided by your surgeon's office  Do not shave the surgical area 24 hours before surgery   Do not apply any lotions, creams, including makeup, cologne, deodorant, or perfumes after showering on the day of your surgery  No contact lenses, eye make-up, or artificial eyelashes  Remove nail polish, including gel polish, and any artificial, gel, or acrylic nails if possible  Remove all jewelry including rings and body piercing jewelry  Wear causal clothing that is easy to take on and off  Consider your type of surgery  Keep any valuables, jewelry, piercings at home  Please bring any specially ordered equipment (sling, braces) if indicated  Arrange for a responsible person to drive you to and from the hospital on the day of your surgery  Visitor Guidelines discussed  Call the surgeon's office with any new illnesses, exposures, or additional questions prior to surgery  Please reference your [facility] Surgical Experience Booklet” for additional information to prepare for your upcoming surgery

## 2023-05-18 ENCOUNTER — ANESTHESIA EVENT (OUTPATIENT)
Dept: PERIOP | Facility: HOSPITAL | Age: 48
End: 2023-05-18

## 2023-05-22 ENCOUNTER — HOSPITAL ENCOUNTER (OUTPATIENT)
Facility: HOSPITAL | Age: 48
Setting detail: OUTPATIENT SURGERY
Discharge: HOME | End: 2023-05-22
Attending: OBSTETRICS & GYNECOLOGY | Admitting: OBSTETRICS & GYNECOLOGY
Payer: COMMERCIAL

## 2023-05-22 ENCOUNTER — ANESTHESIA (OUTPATIENT)
Dept: PERIOP | Facility: HOSPITAL | Age: 48
End: 2023-05-22

## 2023-05-22 VITALS
BODY MASS INDEX: 23.18 KG/M2 | TEMPERATURE: 98.6 F | WEIGHT: 135.8 LBS | HEIGHT: 64 IN | RESPIRATION RATE: 15 BRPM | OXYGEN SATURATION: 97 % | SYSTOLIC BLOOD PRESSURE: 106 MMHG | DIASTOLIC BLOOD PRESSURE: 66 MMHG | HEART RATE: 53 BPM

## 2023-05-22 DIAGNOSIS — D25.9 UTERINE LEIOMYOMA, UNSPECIFIED LOCATION: ICD-10-CM

## 2023-05-22 DIAGNOSIS — G89.18 POSTOPERATIVE PAIN: Primary | ICD-10-CM

## 2023-05-22 DIAGNOSIS — Z90.711 S/P LAPAROSCOPIC SUPRACERVICAL HYSTERECTOMY: ICD-10-CM

## 2023-05-22 LAB
ABO GROUP BLD: NORMAL
BLD GP AB SCN SERPL QL: NEGATIVE
EXT PREGNANCY TEST URINE: NEGATIVE
EXT. CONTROL: NORMAL
RH BLD: POSITIVE
SPECIMEN EXPIRATION DATE: NORMAL

## 2023-05-22 PROCEDURE — 86900 BLOOD TYPING SEROLOGIC ABO: CPT | Performed by: OBSTETRICS & GYNECOLOGY

## 2023-05-22 PROCEDURE — 88307 TISSUE EXAM BY PATHOLOGIST: CPT | Performed by: PATHOLOGY

## 2023-05-22 PROCEDURE — 58662 LAPAROSCOPY EXCISE LESIONS: CPT | Performed by: OBSTETRICS & GYNECOLOGY

## 2023-05-22 PROCEDURE — 86850 RBC ANTIBODY SCREEN: CPT | Performed by: OBSTETRICS & GYNECOLOGY

## 2023-05-22 PROCEDURE — 58542 LSH W/T/O UT 250 G OR LESS: CPT | Performed by: OBSTETRICS & GYNECOLOGY

## 2023-05-22 PROCEDURE — C1782 MORCELLATOR: HCPCS | Performed by: OBSTETRICS & GYNECOLOGY

## 2023-05-22 PROCEDURE — 86901 BLOOD TYPING SEROLOGIC RH(D): CPT | Performed by: OBSTETRICS & GYNECOLOGY

## 2023-05-22 PROCEDURE — 81025 URINE PREGNANCY TEST: CPT | Performed by: OBSTETRICS & GYNECOLOGY

## 2023-05-22 RX ORDER — MIDAZOLAM HYDROCHLORIDE 2 MG/2ML
INJECTION, SOLUTION INTRAMUSCULAR; INTRAVENOUS AS NEEDED
Status: DISCONTINUED | OUTPATIENT
Start: 2023-05-22 | End: 2023-05-22

## 2023-05-22 RX ORDER — OXYCODONE HYDROCHLORIDE AND ACETAMINOPHEN 5; 325 MG/1; MG/1
1 TABLET ORAL EVERY 4 HOURS PRN
Qty: 15 TABLET | Refills: 0 | Status: SHIPPED | OUTPATIENT
Start: 2023-05-22

## 2023-05-22 RX ORDER — MEPERIDINE HYDROCHLORIDE 25 MG/ML
12.5 INJECTION INTRAMUSCULAR; INTRAVENOUS; SUBCUTANEOUS
Status: DISCONTINUED | OUTPATIENT
Start: 2023-05-22 | End: 2023-05-22 | Stop reason: HOSPADM

## 2023-05-22 RX ORDER — SODIUM CHLORIDE, SODIUM LACTATE, POTASSIUM CHLORIDE, CALCIUM CHLORIDE 600; 310; 30; 20 MG/100ML; MG/100ML; MG/100ML; MG/100ML
125 INJECTION, SOLUTION INTRAVENOUS CONTINUOUS
Status: DISCONTINUED | OUTPATIENT
Start: 2023-05-22 | End: 2023-05-22 | Stop reason: HOSPADM

## 2023-05-22 RX ORDER — ONDANSETRON 2 MG/ML
INJECTION INTRAMUSCULAR; INTRAVENOUS AS NEEDED
Status: DISCONTINUED | OUTPATIENT
Start: 2023-05-22 | End: 2023-05-22

## 2023-05-22 RX ORDER — IBUPROFEN 600 MG/1
600 TABLET ORAL EVERY 6 HOURS PRN
Status: DISCONTINUED | OUTPATIENT
Start: 2023-05-22 | End: 2023-05-22 | Stop reason: HOSPADM

## 2023-05-22 RX ORDER — PROPOFOL 10 MG/ML
INJECTION, EMULSION INTRAVENOUS AS NEEDED
Status: DISCONTINUED | OUTPATIENT
Start: 2023-05-22 | End: 2023-05-22

## 2023-05-22 RX ORDER — MAGNESIUM HYDROXIDE 1200 MG/15ML
LIQUID ORAL AS NEEDED
Status: DISCONTINUED | OUTPATIENT
Start: 2023-05-22 | End: 2023-05-22 | Stop reason: HOSPADM

## 2023-05-22 RX ORDER — OXYCODONE HYDROCHLORIDE 5 MG/1
5 TABLET ORAL EVERY 4 HOURS PRN
Status: DISCONTINUED | OUTPATIENT
Start: 2023-05-22 | End: 2023-05-22 | Stop reason: HOSPADM

## 2023-05-22 RX ORDER — ROCURONIUM BROMIDE 10 MG/ML
INJECTION, SOLUTION INTRAVENOUS AS NEEDED
Status: DISCONTINUED | OUTPATIENT
Start: 2023-05-22 | End: 2023-05-22

## 2023-05-22 RX ORDER — HYDROMORPHONE HCL/PF 1 MG/ML
0.5 SYRINGE (ML) INJECTION
Status: DISCONTINUED | OUTPATIENT
Start: 2023-05-22 | End: 2023-05-22 | Stop reason: HOSPADM

## 2023-05-22 RX ORDER — CEFAZOLIN SODIUM 1 G/50ML
1000 SOLUTION INTRAVENOUS ONCE
Status: COMPLETED | OUTPATIENT
Start: 2023-05-22 | End: 2023-05-22

## 2023-05-22 RX ORDER — SCOLOPAMINE TRANSDERMAL SYSTEM 1 MG/1
1 PATCH, EXTENDED RELEASE TRANSDERMAL ONCE AS NEEDED
Status: DISCONTINUED | OUTPATIENT
Start: 2023-05-22 | End: 2023-05-22 | Stop reason: HOSPADM

## 2023-05-22 RX ORDER — IBUPROFEN 600 MG/1
600 TABLET ORAL EVERY 6 HOURS PRN
Qty: 30 TABLET | Refills: 0 | Status: SHIPPED | OUTPATIENT
Start: 2023-05-22

## 2023-05-22 RX ORDER — FENTANYL CITRATE 50 UG/ML
INJECTION, SOLUTION INTRAMUSCULAR; INTRAVENOUS AS NEEDED
Status: DISCONTINUED | OUTPATIENT
Start: 2023-05-22 | End: 2023-05-22

## 2023-05-22 RX ORDER — FENTANYL CITRATE/PF 50 MCG/ML
25 SYRINGE (ML) INJECTION
Status: DISCONTINUED | OUTPATIENT
Start: 2023-05-22 | End: 2023-05-22 | Stop reason: HOSPADM

## 2023-05-22 RX ORDER — BUPIVACAINE HYDROCHLORIDE 2.5 MG/ML
INJECTION, SOLUTION EPIDURAL; INFILTRATION; INTRACAUDAL AS NEEDED
Status: DISCONTINUED | OUTPATIENT
Start: 2023-05-22 | End: 2023-05-22 | Stop reason: HOSPADM

## 2023-05-22 RX ORDER — LIDOCAINE HYDROCHLORIDE 10 MG/ML
INJECTION, SOLUTION EPIDURAL; INFILTRATION; INTRACAUDAL; PERINEURAL AS NEEDED
Status: DISCONTINUED | OUTPATIENT
Start: 2023-05-22 | End: 2023-05-22

## 2023-05-22 RX ORDER — GLYCOPYRROLATE 0.2 MG/ML
INJECTION INTRAMUSCULAR; INTRAVENOUS AS NEEDED
Status: DISCONTINUED | OUTPATIENT
Start: 2023-05-22 | End: 2023-05-22

## 2023-05-22 RX ORDER — SENNOSIDES 8.6 MG
650 CAPSULE ORAL EVERY 8 HOURS PRN
Qty: 30 TABLET | Refills: 0 | Status: SHIPPED | OUTPATIENT
Start: 2023-05-22

## 2023-05-22 RX ORDER — ONDANSETRON 2 MG/ML
4 INJECTION INTRAMUSCULAR; INTRAVENOUS ONCE AS NEEDED
Status: DISCONTINUED | OUTPATIENT
Start: 2023-05-22 | End: 2023-05-22 | Stop reason: HOSPADM

## 2023-05-22 RX ORDER — ACETAMINOPHEN 325 MG/1
975 TABLET ORAL EVERY 6 HOURS PRN
Status: DISCONTINUED | OUTPATIENT
Start: 2023-05-22 | End: 2023-05-22 | Stop reason: HOSPADM

## 2023-05-22 RX ORDER — ONDANSETRON 2 MG/ML
4 INJECTION INTRAMUSCULAR; INTRAVENOUS EVERY 6 HOURS PRN
Status: DISCONTINUED | OUTPATIENT
Start: 2023-05-22 | End: 2023-05-22 | Stop reason: HOSPADM

## 2023-05-22 RX ORDER — DEXAMETHASONE SODIUM PHOSPHATE 10 MG/ML
INJECTION, SOLUTION INTRAMUSCULAR; INTRAVENOUS AS NEEDED
Status: DISCONTINUED | OUTPATIENT
Start: 2023-05-22 | End: 2023-05-22

## 2023-05-22 RX ORDER — KETOROLAC TROMETHAMINE 30 MG/ML
INJECTION, SOLUTION INTRAMUSCULAR; INTRAVENOUS AS NEEDED
Status: DISCONTINUED | OUTPATIENT
Start: 2023-05-22 | End: 2023-05-22

## 2023-05-22 RX ADMIN — SUGAMMADEX 200 MG: 100 INJECTION, SOLUTION INTRAVENOUS at 11:52

## 2023-05-22 RX ADMIN — FENTANYL CITRATE 50 MCG: 50 INJECTION INTRAMUSCULAR; INTRAVENOUS at 12:01

## 2023-05-22 RX ADMIN — FENTANYL CITRATE 25 MCG: 50 INJECTION, SOLUTION INTRAMUSCULAR; INTRAVENOUS at 12:36

## 2023-05-22 RX ADMIN — SCOPALAMINE 1 PATCH: 1 PATCH, EXTENDED RELEASE TRANSDERMAL at 08:55

## 2023-05-22 RX ADMIN — SODIUM CHLORIDE, SODIUM LACTATE, POTASSIUM CHLORIDE, AND CALCIUM CHLORIDE 125 ML/HR: .6; .31; .03; .02 INJECTION, SOLUTION INTRAVENOUS at 08:53

## 2023-05-22 RX ADMIN — ROCURONIUM BROMIDE 10 MG: 10 INJECTION, SOLUTION INTRAVENOUS at 11:32

## 2023-05-22 RX ADMIN — ONDANSETRON 4 MG: 2 INJECTION INTRAMUSCULAR; INTRAVENOUS at 11:46

## 2023-05-22 RX ADMIN — FENTANYL CITRATE 100 MCG: 50 INJECTION INTRAMUSCULAR; INTRAVENOUS at 10:30

## 2023-05-22 RX ADMIN — CEFAZOLIN SODIUM 1000 MG: 1 SOLUTION INTRAVENOUS at 10:25

## 2023-05-22 RX ADMIN — FENTANYL CITRATE 25 MCG: 50 INJECTION INTRAMUSCULAR; INTRAVENOUS at 11:51

## 2023-05-22 RX ADMIN — LIDOCAINE HYDROCHLORIDE 80 MG: 10 INJECTION, SOLUTION EPIDURAL; INFILTRATION; INTRACAUDAL; PERINEURAL at 10:30

## 2023-05-22 RX ADMIN — ROCURONIUM BROMIDE 10 MG: 10 INJECTION, SOLUTION INTRAVENOUS at 11:13

## 2023-05-22 RX ADMIN — GLYCOPYRROLATE 0.2 MG: 0.2 INJECTION INTRAMUSCULAR; INTRAVENOUS at 10:53

## 2023-05-22 RX ADMIN — ROCURONIUM BROMIDE 40 MG: 10 INJECTION, SOLUTION INTRAVENOUS at 10:31

## 2023-05-22 RX ADMIN — KETOROLAC TROMETHAMINE 30 MG: 30 INJECTION, SOLUTION INTRAMUSCULAR at 11:46

## 2023-05-22 RX ADMIN — FENTANYL CITRATE 25 MCG: 50 INJECTION INTRAMUSCULAR; INTRAVENOUS at 11:48

## 2023-05-22 RX ADMIN — DEXAMETHASONE SODIUM PHOSPHATE 10 MG: 10 INJECTION INTRAMUSCULAR; INTRAVENOUS at 10:30

## 2023-05-22 RX ADMIN — IBUPROFEN 600 MG: 600 TABLET ORAL at 13:44

## 2023-05-22 RX ADMIN — SODIUM CHLORIDE, SODIUM LACTATE, POTASSIUM CHLORIDE, AND CALCIUM CHLORIDE: .6; .31; .03; .02 INJECTION, SOLUTION INTRAVENOUS at 11:54

## 2023-05-22 RX ADMIN — PROPOFOL 200 MG: 10 INJECTION, EMULSION INTRAVENOUS at 10:31

## 2023-05-22 RX ADMIN — MIDAZOLAM 2 MG: 1 INJECTION INTRAMUSCULAR; INTRAVENOUS at 10:25

## 2023-05-22 RX ADMIN — FENTANYL CITRATE 25 MCG: 50 INJECTION, SOLUTION INTRAMUSCULAR; INTRAVENOUS at 12:18

## 2023-05-22 NOTE — ANESTHESIA PREPROCEDURE EVALUATION
Procedure:  [facility])  W/ BILATERAL SALPINGECTOMY (Abdomen)    Relevant Problems   GYN  abn uterine bleeding         Physical Exam    Airway    Mallampati score: I         Dental       Cardiovascular  Rhythm: regular, Rate: normal,     Pulmonary  Breath sounds clear to auscultation,     Other Findings        Anesthesia Plan  ASA Score- 1     Anesthesia Type- general with ASA Monitors  Additional Monitors:   Airway Plan:           Plan Factors-Exercise tolerance (METS): >4 METS  Chart reviewed  Existing labs reviewed  Patient summary reviewed  Patient is not a current smoker  Patient not instructed to abstain from smoking on day of procedure  Patient did not smoke on day of surgery  There is medical exclusion for perioperative obstructive sleep apnea risk education  Induction- intravenous  Postoperative Plan-     Informed Consent- Anesthetic plan and risks discussed with patient

## 2023-05-22 NOTE — OP NOTE
OPERATIVE REPORT  PATIENT NAME: Sarah Massey    :  1975  MRN: [id number]  Pt Location: [facility] OR ROOM 01    SURGERY DATE: 2023    Surgeon(s) and Role:     Lion Witt DO - Primary     * Nahum Pillai MD - Assisting    Preop Diagnosis:  Uterine leiomyoma, unspecified location [D25 9]    Post-Op Diagnosis Codes:     * Uterine leiomyoma, unspecified location [D25 9]  Endometriosis    Procedure(s):  ([address] [place] [address])  W/ BILATERAL SALPINGECTOMY AND FULGARATION OF ENDOMETRIOSIS    Specimen(s):  ID Type Source Tests Collected by Time Destination   1 : Uterus and bilateral fallopian tubes Tissue Uterus TISSUE Zee Muse DO 2023 1053        Estimated Blood Loss:   50mL    Drains:  [REMOVED] Urethral Catheter Latex 16 Fr  (Removed)   Number of days: 0       Anesthesia Type:   General endotracheal    Operative Indications:  Uterine leiomyoma, unspecified location [D25 9]    Operative Findings:  Normal external genitalia and urethral meatus  On bimanual exam the uterus was enlarged and irregularly shaped with a palpable right adnexal mass  The cervix was normal   The uterus sounded to 8cm  On laparoscopy, there were multiple pedunculated and subserosal leiomyomas distorting the external uterine contour  There were implants of endometriosis over both uterosacral ligaments  The cervical stump was hemostatic at completion of the procedure    Complications:   None apparent    Procedure and Technique:  The patient was taken to the operating room where she was correctly identified and general anesthesia was administered without difficulty  Ancef 1g IV was administered for surgical prophylaxis  SCDs were applied to the lower extremities  She was then positioned in dorsal lithotomy using yellofin stirrups with the arms tucked  All pressure points were padded  The vagina was prepped with Chlorhexidine   The abdomen was prepped with Chloraprep and appropriate drying time allowed before sterile drapes were applied  Time-out was performed with all parties in agreement  The anterior lip of the cervix was visualized and grasped with a single-tooth tenaculum  The uterus sounded to 8cm in mid position  The cervix was serially dilated to accommodate the COLBY uterine manipulator tip  The tip balloon was inflated  A Trotter catheter was inserted into the bladder  Sterile gloves were exchanged and attention turned to the abdomen  A 10mm incision was made at the inferior edge of the umbilicus  The Veress needle was then introduced and used to establish pneumoperitoneum  The 10 mm trocar which was inserted under direct visualization  There was no evidence of injury directly below the trocar of Veress needle insertion sites  Pneumoperitoneum was then maintained to a maximum of 15 mmHg  Two additional small incisions were made in both the right and left lower quadrants, and 5mm ports were introduced under direct visualization at these sites  The patient was placed in trendelenburg, and attention was then turned to the pelvis  The uterus was irregularly shaped with multiple submucosal and pedunculated leiomyomas  The Ligasure dissecting device was selected  The left fallopian tube was grasped and elevated and the mesosalpinx was coagulated and cut  The fallopian tube was amputated at the cornua and withdrawn from the abdomen  The left round and uteroovarian ligaments were coagulated and cut  The broad ligament was coagulated and cut down to the level of the uterine arteries  The same procedures were then performed on the right side  Once both uterine arteries were coagulated and cut and the bladder confirmed to be down, the Eliud loop was inserted through the left lower trocar and the uterine corpus amputated  The right uterine artery was oozing and was sealed with the Ligasure  The cervical stump was further coagulated with the monopolar J-hook     A 1cm leiomyoma was then excised from adjacent to the right ovary and withdrawn from the abdomen  Endometriosis implants over the uterosacral ligaments were fulgurated after confirming the course of the ureters  The right lower trocar was upsized to a 15mm trocar  An endocatch bag was inserted and the specimen loaded  The left lower trocar was upsized to 10mm  Power morcellation was performed with the specimen in the bag and the blade of the device in view at all times  Hemostasis was again confirmed at the cervical stump  The endocatch bag was then withdrawn  The M-close device was used to close the lower trocar incisions with 0-Vicryl after infiltrating Lidocaine at these sites  Pneumoperitoneum was allowed to escape  The trocar and laparoscope were withdrawn  Skin incisions were closed with 3-0 Plain gut and hemostasis achieved  The adam catheter was removed from the bladder  All specimens were sent for pathological analysis  Patient was extubated and transferred to the recovery room in stable condition  She tolerate the procedure well  All needle, sponge, and instrument counts were noted to be correct x 2 at the end of the procedure  Dr Eryn Yang was present for the entire procedure        Patient Disposition:  PACU         SIGNATURE: Cristine Velasquez MD  DATE: May 22, 2023  TIME: 12:09 PM

## 2023-05-22 NOTE — ANESTHESIA POSTPROCEDURE EVALUATION
"Post-Op Assessment Note    CV Status:  Stable  Pain Score: 2    Pain management: adequate     Mental Status:  Alert and awake   Hydration Status:  Euvolemic   PONV Controlled:  Controlled   Airway Patency:  Patent      Post Op Vitals Reviewed: Yes      Staff: Anesthesiologist         No notable events documented      BP      Temp      Pulse    Resp      SpO2      /66   Pulse (!) 53   Temp 98 6 °F (37 °C)   Resp 15   Ht 5' 3 5\" (1 613 m)   Wt 61 6 kg (135 lb 12 9 oz)   LMP 05/12/2023 (Exact Date)   SpO2 97%   BMI 23 68 kg/m² \    "

## 2023-06-01 ENCOUNTER — DOCUMENTATION (OUTPATIENT)
Dept: GYNECOLOGY | Facility: CLINIC | Age: 48
End: 2023-06-01

## 2023-06-06 ENCOUNTER — OFFICE VISIT (OUTPATIENT)
Dept: GYNECOLOGY | Facility: CLINIC | Age: 48
End: 2023-06-06

## 2023-06-06 VITALS
WEIGHT: 135 LBS | BODY MASS INDEX: 23.54 KG/M2 | DIASTOLIC BLOOD PRESSURE: 60 MMHG | HEART RATE: 63 BPM | SYSTOLIC BLOOD PRESSURE: 100 MMHG

## 2023-06-06 DIAGNOSIS — Z48.89 POSTOPERATIVE VISIT: Primary | ICD-10-CM

## 2023-06-06 PROCEDURE — 99024 POSTOP FOLLOW-UP VISIT: CPT | Performed by: OBSTETRICS & GYNECOLOGY

## 2023-06-06 NOTE — PROGRESS NOTES
Patient presents for postoperative check  She is status post [facility] BS on May 22  She is doing well since surgery with no complaints  Physical exam: Port sites are healing well with no erythema exudate or induration      Impression: Normal postoperative check    Plan: Return to the office as needed/annual

## 2023-08-11 ENCOUNTER — TELEPHONE (OUTPATIENT)
Dept: GYNECOLOGY | Facility: CLINIC | Age: 48
End: 2023-08-11

## 2023-08-11 NOTE — TELEPHONE ENCOUNTER
Pt has concerns about weight gain. 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 She's tried intermittent fasting and she seems to be gaining instead of losing.    Please advise

## 2023-08-14 DIAGNOSIS — R63.5 WEIGHT GAIN: Primary | ICD-10-CM

## 2023-08-14 NOTE — TELEPHONE ENCOUNTER
Spoke with pt about labs being entered.  Let her know she can go to any [facility] to have labs drawn

## 2023-08-31 LAB
ESTRADIOL SERPL-MCNC: 233 PG/ML
FSH SERPL-ACNC: 4.5 MIU/ML
LH SERPL-ACNC: 6.9 MIU/ML
SL AMB T4, FREE (DIRECT): 1.03 NG/DL (ref 0.82–1.77)
TSH SERPL DL<=0.005 MIU/L-ACNC: 5.13 UIU/ML (ref 0.45–4.5)

## 2023-09-05 ENCOUNTER — TELEPHONE (OUTPATIENT)
Dept: GYNECOLOGY | Facility: CLINIC | Age: 48
End: 2023-09-05

## 2023-09-05 NOTE — TELEPHONE ENCOUNTER
Notified patient to f/u with PCP RE: to her elevated TSH.    ----- Message from Piotr Snider DO sent at 9/2/2023  8:53 PM EDT -----  TSH elevated.  Refer to PCP

## 2023-09-06 DIAGNOSIS — R79.89 ELEVATED TSH: Primary | ICD-10-CM

## 2023-09-06 NOTE — PROGRESS NOTES
[facility] [phone] for consult to discuss elevated TSH.

## 2023-09-29 ENCOUNTER — TELEPHONE (OUTPATIENT)
Dept: GYNECOLOGY | Facility: CLINIC | Age: 48
End: 2023-09-29

## 2023-09-29 NOTE — TELEPHONE ENCOUNTER
Alfredo Friend from [facility] called regarding patients referral.  Letter was not sufficient. Please call to discuss reason Dr Kristin Rueda feels this referral is necessary.

## 2023-10-02 NOTE — TELEPHONE ENCOUNTER
[facility] wants pt to follow up with a pcp before moving forward with them.  Pt currently does not have pcp and does not wish to establish care with one at this time

## 2024-01-04 ENCOUNTER — VBI (OUTPATIENT)
Dept: ADMINISTRATIVE | Facility: OTHER | Age: 49
End: 2024-01-04

## 2024-01-04 NOTE — TELEPHONE ENCOUNTER
Upon review of the In Basket request we were able to locate, review, and update the patient chart as requested for Mammogram.    Any additional questions or concerns should be emailed to the Practice Liaisons via the appropriate education email address, please do not reply via In Basket.    Thank you  Priyanka Womack

## 2024-03-04 DIAGNOSIS — B00.9 HERPES: ICD-10-CM

## 2024-03-04 RX ORDER — VALACYCLOVIR HYDROCHLORIDE 1 G/1
1000 TABLET, FILM COATED ORAL DAILY
Qty: 90 TABLET | Refills: 0 | Status: SHIPPED | OUTPATIENT
Start: 2024-03-04 | End: 2024-06-02

## 2024-03-04 NOTE — TELEPHONE ENCOUNTER
Reason for call:   [x] Refill   [] Prior Auth  [] Other:     Office:   [] PCP/Provider -   [x] Specialty/Provider -     Medication: Valtrex    Dose/Frequency: 1,000 mg tablet    Quantity: #90    Pharmacy: [facility]    Does the patient have enough for 3 days?   [] Yes   [x] No - Send as HP to POD

## 2024-03-20 DIAGNOSIS — Z00.6 ENCOUNTER FOR EXAMINATION FOR NORMAL COMPARISON OR CONTROL IN CLINICAL RESEARCH PROGRAM: ICD-10-CM

## 2024-06-07 DIAGNOSIS — B00.9 HERPES: ICD-10-CM

## 2024-06-10 ENCOUNTER — TELEPHONE (OUTPATIENT)
Age: 49
End: 2024-06-10

## 2024-06-10 RX ORDER — VALACYCLOVIR HYDROCHLORIDE 500 MG/1
500 TABLET, FILM COATED ORAL DAILY
Qty: 90 TABLET | Refills: 0 | Status: SHIPPED | OUTPATIENT
Start: 2024-06-10 | End: 2024-09-08

## 2024-06-10 NOTE — TELEPHONE ENCOUNTER
Patient called asking why the valtrex dosage is only 500mg this time instead of the 1000mg she usually gets... Please advise.

## 2024-07-05 ENCOUNTER — APPOINTMENT (OUTPATIENT)
Dept: LAB | Facility: CLINIC | Age: 49
End: 2024-07-05

## 2024-07-05 DIAGNOSIS — Z00.6 ENCOUNTER FOR EXAMINATION FOR NORMAL COMPARISON OR CONTROL IN CLINICAL RESEARCH PROGRAM: ICD-10-CM

## 2024-07-05 PROCEDURE — 36415 COLL VENOUS BLD VENIPUNCTURE: CPT

## 2024-07-19 LAB
APOB+LDLR+PCSK9 GENE MUT ANL BLD/T: NOT DETECTED
BRCA1+BRCA2 DEL+DUP + FULL MUT ANL BLD/T: NOT DETECTED
MLH1+MSH2+MSH6+PMS2 GN DEL+DUP+FUL M: NOT DETECTED

## 2024-07-26 ENCOUNTER — APPOINTMENT (OUTPATIENT)
Dept: LAB | Facility: MEDICAL CENTER | Age: 49
End: 2024-07-26
Payer: COMMERCIAL

## 2024-07-26 ENCOUNTER — OFFICE VISIT (OUTPATIENT)
Dept: GYNECOLOGY | Facility: CLINIC | Age: 49
End: 2024-07-26
Payer: COMMERCIAL

## 2024-07-26 VITALS
HEIGHT: 64 IN | HEART RATE: 48 BPM | SYSTOLIC BLOOD PRESSURE: 100 MMHG | BODY MASS INDEX: 22.26 KG/M2 | WEIGHT: 130.4 LBS | DIASTOLIC BLOOD PRESSURE: 70 MMHG

## 2024-07-26 DIAGNOSIS — B37.9 CANDIDIASIS: ICD-10-CM

## 2024-07-26 DIAGNOSIS — Z01.419 ENCOUNTER FOR GYNECOLOGICAL EXAMINATION WITHOUT ABNORMAL FINDING: Primary | ICD-10-CM

## 2024-07-26 DIAGNOSIS — Z11.3 SCREENING EXAMINATION FOR STD (SEXUALLY TRANSMITTED DISEASE): ICD-10-CM

## 2024-07-26 DIAGNOSIS — R63.5 WEIGHT GAIN: ICD-10-CM

## 2024-07-26 DIAGNOSIS — Z12.31 ENCOUNTER FOR SCREENING MAMMOGRAM FOR MALIGNANT NEOPLASM OF BREAST: ICD-10-CM

## 2024-07-26 LAB
ESTRADIOL SERPL-MCNC: 188.9 PG/ML
FSH SERPL-ACNC: 6.8 MIU/ML
HBV SURFACE AG SER QL: NORMAL
LH SERPL-ACNC: 4.2 MIU/ML
TREPONEMA PALLIDUM IGG+IGM AB [PRESENCE] IN SERUM OR PLASMA BY IMMUNOASSAY: NORMAL
TSH SERPL DL<=0.05 MIU/L-ACNC: 2.71 UIU/ML (ref 0.45–4.5)

## 2024-07-26 PROCEDURE — 84443 ASSAY THYROID STIM HORMONE: CPT

## 2024-07-26 PROCEDURE — 87340 HEPATITIS B SURFACE AG IA: CPT

## 2024-07-26 PROCEDURE — 87389 HIV-1 AG W/HIV-1&-2 AB AG IA: CPT

## 2024-07-26 PROCEDURE — 83002 ASSAY OF GONADOTROPIN (LH): CPT

## 2024-07-26 PROCEDURE — 36415 COLL VENOUS BLD VENIPUNCTURE: CPT

## 2024-07-26 PROCEDURE — 82670 ASSAY OF TOTAL ESTRADIOL: CPT

## 2024-07-26 PROCEDURE — 86780 TREPONEMA PALLIDUM: CPT

## 2024-07-26 PROCEDURE — 87491 CHLMYD TRACH DNA AMP PROBE: CPT | Performed by: OBSTETRICS & GYNECOLOGY

## 2024-07-26 PROCEDURE — 83001 ASSAY OF GONADOTROPIN (FSH): CPT

## 2024-07-26 PROCEDURE — 87591 N.GONORRHOEAE DNA AMP PROB: CPT | Performed by: OBSTETRICS & GYNECOLOGY

## 2024-07-26 PROCEDURE — 99396 PREV VISIT EST AGE 40-64: CPT | Performed by: OBSTETRICS & GYNECOLOGY

## 2024-07-26 PROCEDURE — 87661 TRICHOMONAS VAGINALIS AMPLIF: CPT | Performed by: OBSTETRICS & GYNECOLOGY

## 2024-07-26 PROCEDURE — G0145 SCR C/V CYTO,THINLAYER,RESCR: HCPCS | Performed by: OBSTETRICS & GYNECOLOGY

## 2024-07-26 RX ORDER — CLOTRIMAZOLE AND BETAMETHASONE DIPROPIONATE 10; .64 MG/G; MG/G
CREAM TOPICAL 2 TIMES DAILY
Qty: 45 G | Refills: 1 | Status: SHIPPED | OUTPATIENT
Start: 2024-07-26

## 2024-07-26 NOTE — PROGRESS NOTES
Ambulatory Visit  Name: Sri Daniels      : 1975      MRN: [id number]  Encounter Provider: Hossein Griffin DO  Encounter Date: 2024   Encounter department: [facility]    Assessment & Plan   1. Encounter for gynecological examination without abnormal finding  -     Liquid-based pap, screening  2. Screening examination for STD (sexually transmitted disease)  -     HIV 1/2 AB/AG w Reflex [facility] for 2 yr old and above; Future  -     RPR-Syphilis Screening (Total Syphilis IGG/IGM); Future  -     Hepatitis B surface antigen; Future  -     Chlamydia/GC amplified DNA by PCR  3. Encounter for screening mammogram for malignant neoplasm of breast  4. Candidiasis  -     terconazole (TERAZOL 7) 0.4 % vaginal cream; Insert 1 applicator into the vagina daily at bedtime  -     clotrimazole-betamethasone (LOTRISONE) 1-0.05 % cream; Apply topically 2 (two) times a day      History of Present Illness     Sri Daniels is a 48 y.o. female who presents for annual examination.  Status post LSH BS secondary to leiomyomata uteri and endometriosis in May 2023.  She presents today requesting STD testing.  She is experiencing some vaginal discharge and some periclitoral itching.  Denies any fever, abdominal or pelvic pain.    Recent colonoscopy secondary to chronic constipation.  Patient had a polyp.  She has been advised repeat colonoscopy in 3 years.  She was also given a prescription for Linzess    Review of Systems   Constitutional: Negative.    HENT:  Negative for sore throat and trouble swallowing.    Gastrointestinal:  Positive for constipation. Negative for blood in stool, diarrhea, nausea and vomiting.   Genitourinary: Negative.      Past Medical History   Past Medical History:   Diagnosis Date    Abnormal uterine bleeding     Herpes simplex      Past Surgical History:   Procedure Laterality Date    KNEE SURGERY Right     arthroscopy    MYOMECTOMY      DC LAPS SUPRACRV  HYSTERECT 250 GM/< RMVL TUBE/OVAR N/A 5/22/2023    Procedure: (LSH)  W/ BILATERAL SALPINGECTOMY AND FULGARATION OF ENDOMETRIOSIS;  Surgeon: Hossein Griffin DO;  Location: [facility];  Service: Gynecology     Family History   Problem Relation Age of Onset    Breast cancer Mother     Thyroid disease Mother     Cancer Father         Lung     Current Outpatient Medications on File Prior to Visit   Medication Sig Dispense Refill    B Complex Vitamins (B COMPLEX PO) Take by mouth in the morning      cholecalciferol (VITAMIN D3) 1,000 units tablet Take 1,000 Units by mouth daily      FERROUS SULFATE PO Take by mouth in the morning      levOCARNitine (CARNITINE PO) Take by mouth in the morning      magnesium (MAGTAB) 84 MG (7MEQ) TBCR Take 84 mg by mouth daily      Probiotic Product (PROBIOTIC-10 PO) Take by mouth      Sodium Hyaluronate, oral, (HYALURONIC ACID PO) Take by mouth in the morning      valACYclovir (VALTREX) 500 mg tablet Take 1 tablet (500 mg total) by mouth daily 90 tablet 0    acetaminophen (TYLENOL) 650 mg CR tablet Take 1 tablet (650 mg total) by mouth every 8 (eight) hours as needed for mild pain (Patient not taking: Reported on 7/26/2024) 30 tablet 0    DANDELION PO Take by mouth daily after breakfast (Patient not taking: Reported on 7/26/2024)      ibuprofen (MOTRIN) 600 mg tablet Take 1 tablet (600 mg total) by mouth every 6 (six) hours as needed for mild pain (Patient not taking: Reported on 7/26/2024) 30 tablet 0    oxyCODONE-acetaminophen (Percocet) 5-325 mg per tablet Take 1 tablet by mouth every 4 (four) hours as needed for moderate pain for up to 15 doses Max Daily Amount: 6 tablets 15 tablet 0     No current facility-administered medications on file prior to visit.   No Known Allergies   Current Outpatient Medications on File Prior to Visit   Medication Sig Dispense Refill    B Complex Vitamins (B COMPLEX PO) Take by mouth in the morning      cholecalciferol (VITAMIN D3) 1,000 units tablet  "Take 1,000 Units by mouth daily      FERROUS SULFATE PO Take by mouth in the morning      levOCARNitine (CARNITINE PO) Take by mouth in the morning      magnesium (MAGTAB) 84 MG (7MEQ) TBCR Take 84 mg by mouth daily      Probiotic Product (PROBIOTIC-10 PO) Take by mouth      Sodium Hyaluronate, oral, (HYALURONIC ACID PO) Take by mouth in the morning      valACYclovir (VALTREX) 500 mg tablet Take 1 tablet (500 mg total) by mouth daily 90 tablet 0    acetaminophen (TYLENOL) 650 mg CR tablet Take 1 tablet (650 mg total) by mouth every 8 (eight) hours as needed for mild pain (Patient not taking: Reported on 2024) 30 tablet 0    DANDELION PO Take by mouth daily after breakfast (Patient not taking: Reported on 2024)      ibuprofen (MOTRIN) 600 mg tablet Take 1 tablet (600 mg total) by mouth every 6 (six) hours as needed for mild pain (Patient not taking: Reported on 2024) 30 tablet 0    oxyCODONE-acetaminophen (Percocet) 5-325 mg per tablet Take 1 tablet by mouth every 4 (four) hours as needed for moderate pain for up to 15 doses Max Daily Amount: 6 tablets 15 tablet 0     No current facility-administered medications on file prior to visit.      Social History     Tobacco Use    Smoking status: Former     Current packs/day: 0.00     Average packs/day: 0.3 packs/day for 10.0 years (2.5 ttl pk-yrs)     Types: Cigarettes     Start date:      Quit date:      Years since quittin.5    Smokeless tobacco: Never    Tobacco comments:     Smoked for 11 yrs   Vaping Use    Vaping status: Never Used   Substance and Sexual Activity    Alcohol use: Yes     Alcohol/week: 2.0 standard drinks of alcohol     Types: 2 Standard drinks or equivalent per week     Comment: social    Drug use: No    Sexual activity: Yes     Partners: Male     Birth control/protection: None     Comment: hysterectomy     Objective     /70   Pulse (!) 48   Ht 5' 3.5\" (1.613 m)   Wt 59.1 kg (130 lb 6.4 oz)   BMI 22.74 kg/m² "     Physical Exam  Vitals reviewed.   Cardiovascular:      Rate and Rhythm: Normal rate and regular rhythm.      Pulses: Normal pulses.      Heart sounds: Normal heart sounds. No murmur heard.  Pulmonary:      Effort: Pulmonary effort is normal. No respiratory distress.      Breath sounds: Normal breath sounds.   Chest:   Breasts:     Right: No swelling, bleeding, inverted nipple, mass, nipple discharge, skin change or tenderness.      Left: No swelling, bleeding, inverted nipple, mass, nipple discharge, skin change or tenderness.   Abdominal:      General: There is no distension.      Palpations: Abdomen is soft. There is no mass.      Tenderness: There is no abdominal tenderness. There is no guarding or rebound.      Hernia: No hernia is present. There is no hernia in the left inguinal area or right inguinal area.   Genitourinary:     General: Normal vulva.      Labia:         Right: No rash, tenderness or lesion.         Left: No rash, tenderness or lesion.       Vagina: Vaginal discharge present.      Cervix: Normal.      Uterus: Absent.       Adnexa:         Right: No mass, tenderness or fullness.          Left: No mass, tenderness or fullness.     Musculoskeletal:      Cervical back: Normal range of motion and neck supple. No tenderness.   Lymphadenopathy:      Cervical: No cervical adenopathy.      Upper Body:      Right upper body: No supraclavicular, axillary or pectoral adenopathy.      Left upper body: No supraclavicular, axillary or pectoral adenopathy.      Lower Body: No right inguinal adenopathy. No left inguinal adenopathy.   Neurological:      Mental Status: She is alert.       Administrative Statements

## 2024-07-27 LAB
HIV 1+2 AB+HIV1 P24 AG SERPL QL IA: NORMAL
HIV 2 AB SERPL QL IA: NORMAL
HIV1 AB SERPL QL IA: NORMAL
HIV1 P24 AG SERPL QL IA: NORMAL

## 2024-07-31 LAB
C TRACH DNA SPEC QL NAA+PROBE: NEGATIVE
N GONORRHOEA DNA SPEC QL NAA+PROBE: NEGATIVE
T VAGINALIS DNA SPEC QL NAA+PROBE: NEGATIVE

## 2024-08-02 LAB
LAB AP GYN PRIMARY INTERPRETATION: NORMAL
Lab: NORMAL
PATH INTERP SPEC-IMP: NORMAL

## 2024-08-06 ENCOUNTER — TELEPHONE (OUTPATIENT)
Age: 49
End: 2024-08-06

## 2024-08-06 DIAGNOSIS — B00.9 HERPES: ICD-10-CM

## 2024-08-06 RX ORDER — VALACYCLOVIR HYDROCHLORIDE 500 MG/1
500 TABLET, FILM COATED ORAL DAILY
Qty: 100 TABLET | Refills: 1 | Status: SHIPPED | OUTPATIENT
Start: 2024-08-06 | End: 2025-02-22

## 2024-08-06 NOTE — TELEPHONE ENCOUNTER
Assessment & Plan     1. De Quervain's disease (tenosynovitis)  - advised to use ibuprofen 600 mg at bedtime to help with pain, take with food  - declined splint, will buy OTC  - splinting at night for 2 weeks, if tolerating can increase to 24 hours  - f/u with orhto if symptoms are not improving in 4 weeks   - Orthopedic  Referral; Future    2. Trigger finger, acquired  - Orthopedic  Referral; Future        Deqa Amparo Bass MD  [facility]    Antionette Beavers is a 61 year old who presents for the following health issues     HPI     Six weeks has had left wrist pain. Pain is intermittent and worse in the morning and slightly improves throughout the day. Pain is triggered by certain activities and then it really hurts. No numbness/tingling of hands. No swelling. Pt is concerned about RA. Pt is right handed. She has tried OTC ibuprofen which mildly helps.     Review of Systems         Objective    LMP 04/05/2010   There is no height or weight on file to calculate BMI.  Physical Exam   GENERAL: healthy, alert and no distress  MS: + left wrist, + finkelstein test                 Please resend Rx.

## 2024-08-06 NOTE — TELEPHONE ENCOUNTER
Patient calling stating that her clindamycin prescription was sent to the wrong pharmacy and would need to be sent to Pharmacy: [facility].    Clindamycin 2% vaginal cream to be sent to [facility]

## 2025-02-14 DIAGNOSIS — B00.9 HERPES: ICD-10-CM

## 2025-02-14 RX ORDER — VALACYCLOVIR HYDROCHLORIDE 500 MG/1
500 TABLET, FILM COATED ORAL DAILY
Qty: 90 TABLET | Refills: 3 | Status: SHIPPED | OUTPATIENT
Start: 2025-02-14 | End: 2025-05-15

## (undated) DEVICE — Device

## (undated) DEVICE — SCD SEQUENTIAL COMPRESSION COMFORT SLEEVE MEDIUM KNEE LENGTH: Brand: KENDALL SCD

## (undated) DEVICE — 40595 XL TRENDELENBURG POSITIONING KIT: Brand: 40595 XL TRENDELENBURG POSITIONING KIT

## (undated) DEVICE — INTENDED FOR TISSUE SEPARATION, AND OTHER PROCEDURES THAT REQUIRE A SHARP SURGICAL BLADE TO PUNCTURE OR CUT.: Brand: BARD-PARKER SAFETY BLADES SIZE 11, STERILE

## (undated) DEVICE — CHLORAPREP HI-LITE 26ML ORANGE

## (undated) DEVICE — BETHLEHEM UNIVERSAL GYN LAP PK: Brand: CARDINAL HEALTH

## (undated) DEVICE — INSUFFLATION NEEDLE TO ESTABLISH PNEUMOPERITONEUM.: Brand: INSUFFLATION NEEDLE

## (undated) DEVICE — TROCAR: Brand: KII SLEEVE

## (undated) DEVICE — Device: Brand: TISSUE RETRIEVAL SYSTEM

## (undated) DEVICE — PENCIL ELECTROSURG E-Z CLEAN -0035H

## (undated) DEVICE — 3000CC GUARDIAN II: Brand: GUARDIAN

## (undated) DEVICE — IRRIG ENDO FLO TUBING

## (undated) DEVICE — PLASTIC ADHESIVE BANDAGE: Brand: CURITY

## (undated) DEVICE — ADHESIVE SKIN HIGH VISCOSITY EXOFIN 1ML

## (undated) DEVICE — TROCAR: Brand: KII FIOS FIRST ENTRY

## (undated) DEVICE — STRL COTTON TIP APPLCTR 6IN PK: Brand: CARDINAL HEALTH

## (undated) DEVICE — UTERINE MANIPULATOR RUMI 6.7 X 8 CM

## (undated) DEVICE — PREMIUM DRY TRAY LF: Brand: MEDLINE INDUSTRIES, INC.

## (undated) DEVICE — MORCELLATOR LAP LINA XCISE 15 MM OBTURATOR CRDLS

## (undated) DEVICE — GLOVE PI ULTRA TOUCH SZ.7.5

## (undated) DEVICE — LAPAROSCOPIC SMOKE EVAC TUBING

## (undated) DEVICE — BLUE HEAT SCOPE WARMER

## (undated) DEVICE — ELECTRODE LAP J HOOK E-Z CLEAN 33CM-0021

## (undated) DEVICE — [HIGH FLOW INSUFFLATOR,  DO NOT USE IF PACKAGE IS DAMAGED,  KEEP DRY,  KEEP AWAY FROM SUNLIGHT,  PROTECT FROM HEAT AND RADIOACTIVE SOURCES.]: Brand: PNEUMOSURE

## (undated) DEVICE — Device: Brand: OLYMPUS

## (undated) DEVICE — SUT PLAIN 3-0 PS-1 27 IN 1640H

## (undated) DEVICE — LIGASURE LAP SLR/DIV MARYLAND 5MM

## (undated) DEVICE — DRAPE EQUIPMENT RF WAND

## (undated) DEVICE — TRAY FOLEY 16FR URIMETER SILICONE SURESTEP

## (undated) DEVICE — FLEXIBLE ADHESIVE BANDAGE,X-LARGE: Brand: CURITY

## (undated) DEVICE — MAYO STAND COVER: Brand: CONVERTORS